# Patient Record
(demographics unavailable — no encounter records)

---

## 2024-10-14 NOTE — HISTORY OF PRESENT ILLNESS
[FreeTextEntry1] : Her son reports that every morning her legs feel numb and she has difficulty walking. She is taking sinemet 1 tab every 3 hrs. The first dose can take up to an hr to start working. The other LD doses kick in over the same time length She is not doing PT at this time.  She needs assistance overnight to get out of bed.    Nonmotor:  + sleep fragmented and RBD has increased.  + constipation - BM every other day; takes a stool softner + miralax - No cognitive issues -mood is good - Denies VH - - dysphagia   PD meds sinemet  ER 1 tablet at qhs sinemet  IR 1 q3h 6-9-12-3-6 pramipexole 1mg 3 times per day azilect 1mg qd Amantadine qd Melatonin 5mg prn  Prior meds ongentys- dyskinesia

## 2024-10-14 NOTE — PHYSICAL EXAM
[FreeTextEntry1] :  There is mild masked facies. Speech is hypophonic. EOMI There are no resting tremors. There is mild cogwheel rigidity in her limbs. 2+ R>L bradykinesia is present. Pushes to stand and uses a walker to ambulate . Turns en bloc. There was no FOG. Recovers after 2 steps on pull test.  Posture is stooped with mild truncal tilt 2/2 scoliosis. Uses a walker to ambulate.   Right shoulder ROM is limited due to chronic arthropathy

## 2024-10-14 NOTE — DISCUSSION/SUMMARY
[FreeTextEntry1] : Advanced PD with motor fluctuations and overnight and morning akinesia.   Patient was counseled on the following recommendations: - increase sinemet ER 50/200 1 tab hs - cont sinemet IR 1 tab 6 times per day. Consider increase morning doses in the future - cont azilect 1mg qd - cpont amantadine 100mg qd - Take melatonin 5mg hs - f/u pain management - refer to home PT  f/u ACP 3months

## 2024-10-25 NOTE — PHYSICAL EXAM
[General Appearance - Alert] : alert [General Appearance - In No Acute Distress] : in no acute distress [Oriented To Time, Place, And Person] : oriented to person, place, and time [Motor Handedness Right-Handed] : the patient is right hand dominant [FreeTextEntry6] : + rigidity, bradikinesia,  [FreeTextEntry8] : NBOS, stooped posture but with retropulsive tendency.  [FreeTextEntry1] : B/L lumbar paraspinal muscle tenderness with trigger points and spasm.

## 2024-10-25 NOTE — REVIEW OF SYSTEMS
[Joint Stiffness] : joint stiffness [Lower Back Pain] : lower back pain [As Noted in HPI] : as noted in HPI [Sleep Disturbances] : sleep disturbances [Negative] : Heme/Lymph

## 2024-10-25 NOTE — HISTORY OF PRESENT ILLNESS
[FreeTextEntry1] : 66 year RH female with a Pmhx of Parkinson's disease, HTN, DM2, Scoliosis Lumbar stenosis with radiculopathy chronic pain onset of lower back pain > 15 years who was referred by KELLIE Bermudez for chronic lower back pain who is here for follow up.  Since her last visit was scheduled for LESI and was cancelled same day due to elevated BP.  Her meds have since been adjusted accordingly and now BP better controlled.  She continues to report B/L LBP L>R Lower back pain 9/10 radiating down B/L LE L>R to feet associated with numbness and tingling. Pain increased with activity and decreased somewhat with rest.    She was previously treated by outside pain specialist Dr. Mickey Robison and most recently Dr. Tenorio in [facility].  Tried PT but makes pain worse afterwards. Multiple LESI last one approximately 1 year ago, transient relief for a few weeks only. Oxycodone was sedating.    Allergies: NKDA  Prior medications:  LESI , gabapentin Current medications : sinemet  mg, pramipexole 1mg 3 x/d, pantoprazole, MVI, ASA 81 mg, Metformin, ferrous glutonate, Lisinopril , Rasagiline 1mg qd, amantadine   Social Hx : She has 2 children son who is present on exam as well as daughter whom she lives with.  She is using rollator for indoor and community mobility.  She denies smoking, etoh, illicits.

## 2024-10-25 NOTE — PROCEDURE
[FreeTextEntry1] : The procedure risks, hazards and alternatives were discussed with the patient and appropriate consent was obtained. The area over the myofascial spasm / pain was prepped with alcohol utilizing sterile technique. After isolating it between two palpating fingertips a 27 gauge 1.5 needle was placed in the center of the myofascial spasm and a negative aspiration was performed. A total of 5 mL of 1% Lidocaine mixed with Kenalog 40 mg was injected into 3 sites. The patient tolerated procedure well without any apparent difficulties or complications.  Lidocaine [id number] 07/2026, kenalog [id number], exp 05/2026   MARIA LUISA RENDON was monitored in the office for xx minutes after injections and tolerated procedure well without adverse events.

## 2024-10-25 NOTE — ASSESSMENT
[FreeTextEntry1] : 67 y/o F with multiple medical conditions with scoliosis, spinal stenosis with chronic lumbar radiculopathy impeding on QOL and functioning.   - Discussed in detail the nature of chronic pain as well as treatment options including nonopioid pain management PT, therapeutic massage, stretching, exercise program, acupuncture, CBT as well as topical medications, non-opioid pain medications, Trigger point injections, LILIANA.   - TPi performed today without AE -failed PT and HEP  - Case to be d/w movement team. -Will consider trial of duloxetine 20mg daily.  -Recommend LESI which has previously helped.   The patient had the opportunity to ask questions and all were answered to their satisfaction.  The patient verbalized understanding of the management plan and agreed with our recommendations.

## 2025-02-06 NOTE — PHYSICAL EXAM
[No Acute Distress] : no acute distress [Well Nourished] : well nourished [Well Groomed] : well groomed [Well Developed] : well developed [Normal Oropharynx] : normal oropharynx [Normal Appearance] : normal appearance [Supple] : supple [No Neck Mass] : no neck mass [Normal Rate/Rhythm] : normal rate/rhythm [Normal S1, S2] : normal s1, s2 [No Murmurs] : no murmurs [No Resp Distress] : no resp distress [No Acc Muscle Use] : no acc muscle use [Normal Rhythm and Effort] : normal rhythm and effort [Clear to Auscultation Bilaterally] : clear to auscultation bilaterally [No Abnormalities] : no abnormalities [Benign] : benign [Normal Gait] : normal gait [No Clubbing] : no clubbing [No Cyanosis] : no cyanosis [No Edema] : no edema [Normal Color/ Pigmentation] : normal color/ pigmentation [No Sensory Deficits] : no sensory deficits [Cranial Nerves Intact] : cranial nerves intact

## 2025-02-06 NOTE — REVIEW OF SYSTEMS
[Wheezing] : wheezing [Dysphagia] : dysphagia [Involuntary Movements] : involuntary movements [Tremor] : tremor [Negative] : Musculoskeletal

## 2025-02-28 NOTE — DISCUSSION/SUMMARY
[FreeTextEntry1] : Advanced PD with motor fluctuations and overnight and morning akinesia.   Patient was counseled on the following recommendations: -continue sinemet ER 50/200 1 tab hs -cont sinemet IR 1 tab 6 times per day. Add 3am dose as needed for off periods -cont azilect 1mg qd -continue amantadine 100mg qd -continue melatonin 5mg hs -May consider vyalev in the future -f/u pain management -refer to home PT  f/u 3 months

## 2025-02-28 NOTE — HISTORY OF PRESENT ILLNESS
[FreeTextEntry1] : Her son reports that every morning her legs feel numb and she has difficulty walking. She is taking sinemet 1 tab every 3 hrs. The first dose can take up to an hr to start working. The other LD doses kick in over the same time length. Son reports intermittent involuntary movements which bother her ability to sit,  She is not doing PT at this time. Uses rollator to ambulate  She needs assistance overnight to get out of bed. feels immoble and tremors between 3am-7am  Nonmotor:  + sleep fragmented, rbd controlled with melatonin + constipation - BM every other day; takes a stool softner + miralax - No cognitive issues -mood is good - Denies VH - - dysphagia   PD meds sinemet  ER 1 tablet at qhs sinemet  IR 1 q3h 6-9-12-3-6 pramipexole 1mg 3 times per day azilect 1mg qd Amantadine qd Melatonin 5mg prn  Prior meds ongentys- dyskinesia

## 2025-07-28 NOTE — PHYSICAL EXAM
[Normal Rate and Rhythm] : normal rate and rhythm [2+] : left 2+ [Ankle Swelling (On Exam)] : present [Ankle Swelling Bilaterally] : severe [Alert] : alert [Oriented to Person] : oriented to person [Oriented to Place] : oriented to place [Oriented to Time] : oriented to time [Calm] : calm [Varicose Veins Of Lower Extremities] : not present [] : not present [Abdomen Tenderness] : ~T ~M No abdominal tenderness [de-identified] : NAD  [de-identified] : supple, no masses  [de-identified] : unlabored breathing  [FreeTextEntry1] : pitting edema bilateral LE  [de-identified] : FROM of all 4 extremities  [de-identified] : resting tremor of upper and lower extremities

## 2025-07-28 NOTE — HISTORY OF PRESENT ILLNESS
[FreeTextEntry1] : 67-year-old female with Parkinson's disease, degenerative joint disease, scoliosis, spondylosis thesis, type 2 diabetes mellitus referred for evaluation of bilateral lower extremity edema.  Patient is ambulatory with a rolling walker however she is limited in her activity due to her Parkinson's disease.  Her blood glucose levels are controlled per the patient's son.  She denies rest pain or nonhealing wounds of the lower extremity.  She is currently elevating the legs above heart level but not wearing compression stockings at this time.

## 2025-07-28 NOTE — ASSESSMENT
[Foot care/Footwear] : foot care/footwear [FreeTextEntry1] : 67-year-old female with Parkinson's disease, degenerative joint disease, scoliosis, spondylosis thesis, type 2 diabetes mellitus referred for evaluation of bilateral lower extremity edema.   Venous duplex performed in the office today demonstrates no DVT or SVT. +reflux >0.5 sec just in GSV proximal calf only.  Vein diameter small throughout.  Plan Patient without evidence of clinically significant venous insufficiency. Focal L GSV insufficiency of proximal calf without large vein diameters is not the etiology of her bilateral severe leg edema. Leg edema likely secondary to a combination of relative immobility, sedentary lifestyle and neurologic disease. No indication for intervention at this time. Recommend compression stockings and elevation while at rest. Follow-up as needed.  [Arterial/Venous Disease] : arterial/venous disease

## 2025-07-29 NOTE — HISTORY OF PRESENT ILLNESS
[FreeTextEntry1] : Patient's son reports that she has more off time. Overnight she seems to move better  Family gives her an extra 1/2 tab of CD/LD at 9/3  Denies any recent falls.  She has mod-severe OA of bilateral knees. Cortisone injections have not been helpful she is able to dress and shower independently She is waiting for Home PT  Nonmotor:  + sleep can be fragmented with melatonin 5mg hs + constipation - BM every other day; takes a stool softner + miralax prn - No cognitive changes or hallucinations.  -mood is good - Denies VH has some inc difficulty swallowing. + pahyngeal secretions that has been longstanding and follows with pulm for reaching airway disease No EDS  PD meds sinemet 50/200 ER 1 tablet at qhs sinemet  IR 1 q3h 1/1.5/1/1.5/1 (6-9-12-3/6) pramipexole 1mg 3 times per day azilect 1mg qd Amantadine qd Melatonin 5mg prn  Prior meds ongentys- dyskinesia    PMH: HTN, T2DM

## 2025-07-29 NOTE — PHYSICAL EXAM
[Motor Strength] : muscle strength was normal in all four extremities [FreeTextEntry1] : Last LD was 3hrs   There is mild masked facies. Speech is hypophonic. Increased pharyngeal secretions that are audible. EOMI There are no resting tremors. There is mild cogwheel rigidity in her limbs. 2+ R>L bradykinesia is present. Pushes to stand and uses a walker to ambulate . Turns en bloc. There was no FOG. Dpoes not recover on pull test.  Posture is stooped with mild truncal tilt 2/2 scoliosis. Uses a walker to ambulate.

## 2025-07-29 NOTE — DISCUSSION/SUMMARY
[FreeTextEntry1] : Advanced PD with increased motor fluctuations affecting gait. Overnight akinesia has improved Postural reflexes has worsened in part due to lack of PT Constipation that could affect LD kinetics Dysphagia with pharyngeal secretions  Patient was counseled on the following recommendations:-  raise CD/LD  to 1.5 tabs at 6/9/12/3 and leave 1 tab at 6pm; consider changing to crexont if OFF periods dont improve - cont azilect 1mg qd - cont amantadine 100mg qd - Take melatonin 5mg hs - take miralax regularly - refer SLP eval - refer to home PT  f/u ACP 3months